# Patient Record
Sex: MALE | Race: WHITE | NOT HISPANIC OR LATINO | Employment: OTHER | URBAN - METROPOLITAN AREA
[De-identification: names, ages, dates, MRNs, and addresses within clinical notes are randomized per-mention and may not be internally consistent; named-entity substitution may affect disease eponyms.]

---

## 2024-04-04 ENCOUNTER — APPOINTMENT (OUTPATIENT)
Dept: RADIOLOGY | Facility: HOSPITAL | Age: 89
End: 2024-04-04
Payer: MEDICARE

## 2024-04-04 ENCOUNTER — HOSPITAL ENCOUNTER (OUTPATIENT)
Facility: HOSPITAL | Age: 89
Setting detail: OBSERVATION
Discharge: HOME | End: 2024-04-06
Attending: STUDENT IN AN ORGANIZED HEALTH CARE EDUCATION/TRAINING PROGRAM | Admitting: STUDENT IN AN ORGANIZED HEALTH CARE EDUCATION/TRAINING PROGRAM
Payer: MEDICARE

## 2024-04-04 DIAGNOSIS — G62.9 NEUROPATHY: ICD-10-CM

## 2024-04-04 DIAGNOSIS — R94.31 ACUTE ELECTROCARDIOGRAM CHANGES: ICD-10-CM

## 2024-04-04 DIAGNOSIS — R55 SYNCOPE, UNSPECIFIED SYNCOPE TYPE: Primary | ICD-10-CM
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
## 2024-04-04 DIAGNOSIS — R79.89 ELEVATED TROPONIN: ICD-10-CM

## 2024-04-04 LAB
ALBUMIN SERPL BCP-MCNC: 3.9 G/DL (ref 3.4–5)
ALP SERPL-CCNC: 56 U/L (ref 33–136)
ALT SERPL W P-5'-P-CCNC: 22 U/L (ref 10–52)
ANION GAP SERPL CALC-SCNC: 16 MMOL/L (ref 10–20)
AST SERPL W P-5'-P-CCNC: 22 U/L (ref 9–39)
BASOPHILS # BLD AUTO: 0.02 X10*3/UL (ref 0–0.1)
BASOPHILS NFR BLD AUTO: 0.2 %
BILIRUB SERPL-MCNC: 0.9 MG/DL (ref 0–1.2)
BUN SERPL-MCNC: 43 MG/DL (ref 6–23)
CALCIUM SERPL-MCNC: 8.9 MG/DL (ref 8.6–10.3)
CHLORIDE SERPL-SCNC: 101 MMOL/L (ref 98–107)
CO2 SERPL-SCNC: 24 MMOL/L (ref 21–32)
CREAT SERPL-MCNC: 1.91 MG/DL (ref 0.5–1.3)
EGFRCR SERPLBLD CKD-EPI 2021: 32 ML/MIN/1.73M*2
EOSINOPHIL # BLD AUTO: 0 X10*3/UL (ref 0–0.4)
EOSINOPHIL NFR BLD AUTO: 0 %
ERYTHROCYTE [DISTWIDTH] IN BLOOD BY AUTOMATED COUNT: 12.5 % (ref 11.5–14.5)
ETHANOL SERPL-MCNC: <10 MG/DL
GLUCOSE SERPL-MCNC: 225 MG/DL (ref 74–99)
HCT VFR BLD AUTO: 51.3 % (ref 41–52)
HGB BLD-MCNC: 17.4 G/DL (ref 13.5–17.5)
IMM GRANULOCYTES # BLD AUTO: 0.03 X10*3/UL (ref 0–0.5)
IMM GRANULOCYTES NFR BLD AUTO: 0.4 % (ref 0–0.9)
INR PPP: 1.3 (ref 0.9–1.1)
LACTATE SERPL-SCNC: 2.3 MMOL/L (ref 0.4–2)
LYMPHOCYTES # BLD AUTO: 0.95 X10*3/UL (ref 0.8–3)
LYMPHOCYTES NFR BLD AUTO: 11.1 %
MCH RBC QN AUTO: 34.3 PG (ref 26–34)
MCHC RBC AUTO-ENTMCNC: 33.9 G/DL (ref 32–36)
MCV RBC AUTO: 101 FL (ref 80–100)
MONOCYTES # BLD AUTO: 1.19 X10*3/UL (ref 0.05–0.8)
MONOCYTES NFR BLD AUTO: 13.9 %
NEUTROPHILS # BLD AUTO: 6.35 X10*3/UL (ref 1.6–5.5)
NEUTROPHILS NFR BLD AUTO: 74.4 %
NRBC BLD-RTO: 0 /100 WBCS (ref 0–0)
PLATELET # BLD AUTO: 146 X10*3/UL (ref 150–450)
POTASSIUM SERPL-SCNC: 4.7 MMOL/L (ref 3.5–5.3)
PROT SERPL-MCNC: 6.9 G/DL (ref 6.4–8.2)
PROTHROMBIN TIME: 14.4 SECONDS (ref 9.8–12.8)
RBC # BLD AUTO: 5.07 X10*6/UL (ref 4.5–5.9)
SODIUM SERPL-SCNC: 136 MMOL/L (ref 136–145)
WBC # BLD AUTO: 8.5 X10*3/UL (ref 4.4–11.3)

## 2024-04-04 PROCEDURE — 70450 CT HEAD/BRAIN W/O DYE: CPT

## 2024-04-04 PROCEDURE — 86901 BLOOD TYPING SEROLOGIC RH(D): CPT | Performed by: STUDENT IN AN ORGANIZED HEALTH CARE EDUCATION/TRAINING PROGRAM

## 2024-04-04 PROCEDURE — 85610 PROTHROMBIN TIME: CPT | Performed by: STUDENT IN AN ORGANIZED HEALTH CARE EDUCATION/TRAINING PROGRAM

## 2024-04-04 PROCEDURE — 71045 X-RAY EXAM CHEST 1 VIEW: CPT

## 2024-04-04 PROCEDURE — G0390 TRAUMA RESPONS W/HOSP CRITI: HCPCS

## 2024-04-04 PROCEDURE — 36415 COLL VENOUS BLD VENIPUNCTURE: CPT | Performed by: STUDENT IN AN ORGANIZED HEALTH CARE EDUCATION/TRAINING PROGRAM

## 2024-04-04 PROCEDURE — 82077 ASSAY SPEC XCP UR&BREATH IA: CPT | Performed by: STUDENT IN AN ORGANIZED HEALTH CARE EDUCATION/TRAINING PROGRAM

## 2024-04-04 PROCEDURE — 99291 CRITICAL CARE FIRST HOUR: CPT | Performed by: STUDENT IN AN ORGANIZED HEALTH CARE EDUCATION/TRAINING PROGRAM

## 2024-04-04 PROCEDURE — 83605 ASSAY OF LACTIC ACID: CPT | Performed by: STUDENT IN AN ORGANIZED HEALTH CARE EDUCATION/TRAINING PROGRAM

## 2024-04-04 PROCEDURE — 99285 EMERGENCY DEPT VISIT HI MDM: CPT | Mod: 25

## 2024-04-04 PROCEDURE — 84484 ASSAY OF TROPONIN QUANT: CPT | Performed by: STUDENT IN AN ORGANIZED HEALTH CARE EDUCATION/TRAINING PROGRAM

## 2024-04-04 PROCEDURE — 70450 CT HEAD/BRAIN W/O DYE: CPT | Performed by: RADIOLOGY

## 2024-04-04 PROCEDURE — 80053 COMPREHEN METABOLIC PANEL: CPT | Performed by: STUDENT IN AN ORGANIZED HEALTH CARE EDUCATION/TRAINING PROGRAM

## 2024-04-04 PROCEDURE — 85025 COMPLETE CBC W/AUTO DIFF WBC: CPT | Performed by: STUDENT IN AN ORGANIZED HEALTH CARE EDUCATION/TRAINING PROGRAM

## 2024-04-04 PROCEDURE — 83880 ASSAY OF NATRIURETIC PEPTIDE: CPT | Performed by: STUDENT IN AN ORGANIZED HEALTH CARE EDUCATION/TRAINING PROGRAM

## 2024-04-04 PROCEDURE — 83036 HEMOGLOBIN GLYCOSYLATED A1C: CPT | Performed by: STUDENT IN AN ORGANIZED HEALTH CARE EDUCATION/TRAINING PROGRAM

## 2024-04-05 ENCOUNTER — APPOINTMENT (OUTPATIENT)
Dept: CARDIOLOGY | Facility: HOSPITAL | Age: 89
End: 2024-04-05
Payer: MEDICARE

## 2024-04-05 ENCOUNTER — APPOINTMENT (OUTPATIENT)
Dept: RADIOLOGY | Facility: HOSPITAL | Age: 89
End: 2024-04-05
Payer: MEDICARE

## 2024-04-05 PROBLEM — R55 SYNCOPE, UNSPECIFIED SYNCOPE TYPE: Status: ACTIVE | Noted: 2024-04-05

## 2024-04-05 LAB
ABO GROUP (TYPE) IN BLOOD: NORMAL
ANION GAP SERPL CALC-SCNC: 14 MMOL/L (ref 10–20)
ANTIBODY SCREEN: NORMAL
AORTIC VALVE MEAN GRADIENT: 7 MMHG
AORTIC VALVE PEAK VELOCITY: 1.68 M/S
APPEARANCE UR: CLEAR
AV PEAK GRADIENT: 11.3 MMHG
AVA (PEAK VEL): 1.61 CM2
AVA (VTI): 2.11 CM2
BILIRUB UR STRIP.AUTO-MCNC: NEGATIVE MG/DL
BNP SERPL-MCNC: 81 PG/ML (ref 0–99)
BUN SERPL-MCNC: 41 MG/DL (ref 6–23)
CALCIUM SERPL-MCNC: 9.3 MG/DL (ref 8.6–10.3)
CARDIAC TROPONIN I PNL SERPL HS: 90 NG/L (ref 0–20)
CARDIAC TROPONIN I PNL SERPL HS: 91 NG/L (ref 0–20)
CHLORIDE SERPL-SCNC: 103 MMOL/L (ref 98–107)
CHOLEST SERPL-MCNC: 123 MG/DL (ref 0–199)
CHOLESTEROL/HDL RATIO: 3.7
CO2 SERPL-SCNC: 25 MMOL/L (ref 21–32)
COLOR UR: YELLOW
CREAT SERPL-MCNC: 1.75 MG/DL (ref 0.5–1.3)
EGFRCR SERPLBLD CKD-EPI 2021: 36 ML/MIN/1.73M*2
EJECTION FRACTION APICAL 4 CHAMBER: 72.2
ERYTHROCYTE [DISTWIDTH] IN BLOOD BY AUTOMATED COUNT: 12.6 % (ref 11.5–14.5)
EST. AVERAGE GLUCOSE BLD GHB EST-MCNC: 157 MG/DL
GLUCOSE BLD MANUAL STRIP-MCNC: 158 MG/DL (ref 74–99)
GLUCOSE BLD MANUAL STRIP-MCNC: 166 MG/DL (ref 74–99)
GLUCOSE BLD MANUAL STRIP-MCNC: 177 MG/DL (ref 74–99)
GLUCOSE BLD MANUAL STRIP-MCNC: 194 MG/DL (ref 74–99)
GLUCOSE BLD MANUAL STRIP-MCNC: 197 MG/DL (ref 74–99)
GLUCOSE SERPL-MCNC: 159 MG/DL (ref 74–99)
GLUCOSE UR STRIP.AUTO-MCNC: ABNORMAL MG/DL
HBA1C MFR BLD: 7.1 %
HCT VFR BLD AUTO: 50.5 % (ref 41–52)
HDLC SERPL-MCNC: 33.3 MG/DL
HGB BLD-MCNC: 17.1 G/DL (ref 13.5–17.5)
KETONES UR STRIP.AUTO-MCNC: NEGATIVE MG/DL
LACTATE SERPL-SCNC: 1.5 MMOL/L (ref 0.4–2)
LDLC SERPL CALC-MCNC: 61 MG/DL
LEFT ATRIUM VOLUME AREA LENGTH INDEX BSA: 26.9 ML/M2
LEFT VENTRICULAR OUTFLOW TRACT DIAMETER: 2 CM
LEUKOCYTE ESTERASE UR QL STRIP.AUTO: NEGATIVE
LV EJECTION FRACTION BIPLANE: 69 %
MAGNESIUM SERPL-MCNC: 2.53 MG/DL (ref 1.6–2.4)
MCH RBC QN AUTO: 34.3 PG (ref 26–34)
MCHC RBC AUTO-ENTMCNC: 33.9 G/DL (ref 32–36)
MCV RBC AUTO: 101 FL (ref 80–100)
MITRAL VALVE E/A RATIO: 0.74
NITRITE UR QL STRIP.AUTO: NEGATIVE
NON HDL CHOLESTEROL: 90 MG/DL (ref 0–149)
NRBC BLD-RTO: 0 /100 WBCS (ref 0–0)
PH UR STRIP.AUTO: 6 [PH]
PLATELET # BLD AUTO: 145 X10*3/UL (ref 150–450)
POTASSIUM SERPL-SCNC: 4.4 MMOL/L (ref 3.5–5.3)
PROT UR STRIP.AUTO-MCNC: NEGATIVE MG/DL
RBC # BLD AUTO: 4.99 X10*6/UL (ref 4.5–5.9)
RBC # UR STRIP.AUTO: NEGATIVE /UL
RH FACTOR (ANTIGEN D): NORMAL
RIGHT VENTRICLE FREE WALL PEAK S': 13.5 CM/S
RIGHT VENTRICLE PEAK SYSTOLIC PRESSURE: 18.2 MMHG
SODIUM SERPL-SCNC: 138 MMOL/L (ref 136–145)
SP GR UR STRIP.AUTO: 1.04
TRICUSPID ANNULAR PLANE SYSTOLIC EXCURSION: 2.7 CM
TRIGL SERPL-MCNC: 146 MG/DL (ref 0–149)
UROBILINOGEN UR STRIP.AUTO-MCNC: <2 MG/DL
VLDL: 29 MG/DL (ref 0–40)
WBC # BLD AUTO: 8 X10*3/UL (ref 4.4–11.3)

## 2024-04-05 PROCEDURE — 83605 ASSAY OF LACTIC ACID: CPT | Performed by: STUDENT IN AN ORGANIZED HEALTH CARE EDUCATION/TRAINING PROGRAM

## 2024-04-05 PROCEDURE — 96360 HYDRATION IV INFUSION INIT: CPT

## 2024-04-05 PROCEDURE — 93005 ELECTROCARDIOGRAM TRACING: CPT

## 2024-04-05 PROCEDURE — 2500000001 HC RX 250 WO HCPCS SELF ADMINISTERED DRUGS (ALT 637 FOR MEDICARE OP): Performed by: STUDENT IN AN ORGANIZED HEALTH CARE EDUCATION/TRAINING PROGRAM

## 2024-04-05 PROCEDURE — 2500000002 HC RX 250 W HCPCS SELF ADMINISTERED DRUGS (ALT 637 FOR MEDICARE OP, ALT 636 FOR OP/ED)

## 2024-04-05 PROCEDURE — 71275 CT ANGIOGRAPHY CHEST: CPT

## 2024-04-05 PROCEDURE — 81003 URINALYSIS AUTO W/O SCOPE: CPT | Performed by: STUDENT IN AN ORGANIZED HEALTH CARE EDUCATION/TRAINING PROGRAM

## 2024-04-05 PROCEDURE — 2550000001 HC RX 255 CONTRASTS: Performed by: STUDENT IN AN ORGANIZED HEALTH CARE EDUCATION/TRAINING PROGRAM

## 2024-04-05 PROCEDURE — 99204 OFFICE O/P NEW MOD 45 MIN: CPT | Performed by: STUDENT IN AN ORGANIZED HEALTH CARE EDUCATION/TRAINING PROGRAM

## 2024-04-05 PROCEDURE — 83735 ASSAY OF MAGNESIUM: CPT | Performed by: STUDENT IN AN ORGANIZED HEALTH CARE EDUCATION/TRAINING PROGRAM

## 2024-04-05 PROCEDURE — 36415 COLL VENOUS BLD VENIPUNCTURE: CPT | Performed by: STUDENT IN AN ORGANIZED HEALTH CARE EDUCATION/TRAINING PROGRAM

## 2024-04-05 PROCEDURE — 71275 CT ANGIOGRAPHY CHEST: CPT | Performed by: RADIOLOGY

## 2024-04-05 PROCEDURE — 97161 PT EVAL LOW COMPLEX 20 MIN: CPT | Mod: GP

## 2024-04-05 PROCEDURE — 80061 LIPID PANEL: CPT | Performed by: STUDENT IN AN ORGANIZED HEALTH CARE EDUCATION/TRAINING PROGRAM

## 2024-04-05 PROCEDURE — 2500000004 HC RX 250 GENERAL PHARMACY W/ HCPCS (ALT 636 FOR OP/ED): Performed by: STUDENT IN AN ORGANIZED HEALTH CARE EDUCATION/TRAINING PROGRAM

## 2024-04-05 PROCEDURE — 93306 TTE W/DOPPLER COMPLETE: CPT

## 2024-04-05 PROCEDURE — 2500000002 HC RX 250 W HCPCS SELF ADMINISTERED DRUGS (ALT 637 FOR MEDICARE OP, ALT 636 FOR OP/ED): Performed by: STUDENT IN AN ORGANIZED HEALTH CARE EDUCATION/TRAINING PROGRAM

## 2024-04-05 PROCEDURE — 99222 1ST HOSP IP/OBS MODERATE 55: CPT | Performed by: STUDENT IN AN ORGANIZED HEALTH CARE EDUCATION/TRAINING PROGRAM

## 2024-04-05 PROCEDURE — 76770 US EXAM ABDO BACK WALL COMP: CPT | Mod: FOREIGN READ | Performed by: RADIOLOGY

## 2024-04-05 PROCEDURE — G0378 HOSPITAL OBSERVATION PER HR: HCPCS

## 2024-04-05 PROCEDURE — 2500000001 HC RX 250 WO HCPCS SELF ADMINISTERED DRUGS (ALT 637 FOR MEDICARE OP)

## 2024-04-05 PROCEDURE — 93306 TTE W/DOPPLER COMPLETE: CPT | Performed by: STUDENT IN AN ORGANIZED HEALTH CARE EDUCATION/TRAINING PROGRAM

## 2024-04-05 PROCEDURE — 82947 ASSAY GLUCOSE BLOOD QUANT: CPT | Mod: 59

## 2024-04-05 PROCEDURE — 97165 OT EVAL LOW COMPLEX 30 MIN: CPT | Mod: GO

## 2024-04-05 PROCEDURE — 84484 ASSAY OF TROPONIN QUANT: CPT | Performed by: STUDENT IN AN ORGANIZED HEALTH CARE EDUCATION/TRAINING PROGRAM

## 2024-04-05 PROCEDURE — 76770 US EXAM ABDO BACK WALL COMP: CPT

## 2024-04-05 PROCEDURE — 85027 COMPLETE CBC AUTOMATED: CPT | Performed by: STUDENT IN AN ORGANIZED HEALTH CARE EDUCATION/TRAINING PROGRAM

## 2024-04-05 PROCEDURE — 80048 BASIC METABOLIC PNL TOTAL CA: CPT | Performed by: STUDENT IN AN ORGANIZED HEALTH CARE EDUCATION/TRAINING PROGRAM

## 2024-04-05 PROCEDURE — 71045 X-RAY EXAM CHEST 1 VIEW: CPT | Mod: FOREIGN READ | Performed by: RADIOLOGY

## 2024-04-05 RX ORDER — SODIUM CHLORIDE, SODIUM LACTATE, POTASSIUM CHLORIDE, CALCIUM CHLORIDE 600; 310; 30; 20 MG/100ML; MG/100ML; MG/100ML; MG/100ML
75 INJECTION, SOLUTION INTRAVENOUS CONTINUOUS
Status: ACTIVE | OUTPATIENT
Start: 2024-04-05 | End: 2024-04-05

## 2024-04-05 RX ORDER — POTASSIUM CHLORIDE 750 MG/1
20 TABLET, FILM COATED, EXTENDED RELEASE ORAL DAILY
COMMUNITY

## 2024-04-05 RX ORDER — METOPROLOL SUCCINATE 50 MG/1
50 TABLET, EXTENDED RELEASE ORAL DAILY
Status: DISCONTINUED | OUTPATIENT
Start: 2024-04-05 | End: 2024-04-06 | Stop reason: HOSPADM

## 2024-04-05 RX ORDER — ACETAMINOPHEN 325 MG/1
650 TABLET ORAL EVERY 4 HOURS PRN
Status: DISCONTINUED | OUTPATIENT
Start: 2024-04-05 | End: 2024-04-06 | Stop reason: HOSPADM

## 2024-04-05 RX ORDER — DEXTROSE 50 % IN WATER (D50W) INTRAVENOUS SYRINGE
12.5
Status: DISCONTINUED | OUTPATIENT
Start: 2024-04-05 | End: 2024-04-06 | Stop reason: HOSPADM

## 2024-04-05 RX ORDER — METOPROLOL SUCCINATE 50 MG/1
50 TABLET, EXTENDED RELEASE ORAL DAILY
COMMUNITY

## 2024-04-05 RX ORDER — INSULIN LISPRO 100 [IU]/ML
0-10 INJECTION, SOLUTION INTRAVENOUS; SUBCUTANEOUS EVERY 4 HOURS
Status: DISCONTINUED | OUTPATIENT
Start: 2024-04-05 | End: 2024-04-06 | Stop reason: HOSPADM

## 2024-04-05 RX ORDER — ATORVASTATIN CALCIUM 10 MG/1
10 TABLET, FILM COATED ORAL NIGHTLY
Status: DISCONTINUED | OUTPATIENT
Start: 2024-04-05 | End: 2024-04-06 | Stop reason: HOSPADM

## 2024-04-05 RX ORDER — TORSEMIDE 100 MG/1
50 TABLET ORAL DAILY
COMMUNITY

## 2024-04-05 RX ORDER — INSULIN GLARGINE 100 [IU]/ML
20 INJECTION, SOLUTION SUBCUTANEOUS NIGHTLY
Status: DISCONTINUED | OUTPATIENT
Start: 2024-04-05 | End: 2024-04-06 | Stop reason: HOSPADM

## 2024-04-05 RX ORDER — TAMSULOSIN HYDROCHLORIDE 0.4 MG/1
0.4 CAPSULE ORAL DAILY
COMMUNITY

## 2024-04-05 RX ORDER — ACETAMINOPHEN 650 MG/1
650 SUPPOSITORY RECTAL EVERY 4 HOURS PRN
Status: DISCONTINUED | OUTPATIENT
Start: 2024-04-05 | End: 2024-04-06 | Stop reason: HOSPADM

## 2024-04-05 RX ORDER — BUSPIRONE HYDROCHLORIDE 5 MG/1
5 TABLET ORAL DAILY
COMMUNITY

## 2024-04-05 RX ORDER — TAMSULOSIN HYDROCHLORIDE 0.4 MG/1
0.4 CAPSULE ORAL NIGHTLY
Status: DISCONTINUED | OUTPATIENT
Start: 2024-04-05 | End: 2024-04-06 | Stop reason: HOSPADM

## 2024-04-05 RX ORDER — DEXTROSE 50 % IN WATER (D50W) INTRAVENOUS SYRINGE
25
Status: DISCONTINUED | OUTPATIENT
Start: 2024-04-05 | End: 2024-04-06 | Stop reason: HOSPADM

## 2024-04-05 RX ORDER — INSULIN LISPRO 100 [IU]/ML
25 INJECTION, SOLUTION INTRAVENOUS; SUBCUTANEOUS
COMMUNITY

## 2024-04-05 RX ORDER — GABAPENTIN 300 MG/1
300 CAPSULE ORAL 2 TIMES DAILY
Status: DISCONTINUED | OUTPATIENT
Start: 2024-04-05 | End: 2024-04-06 | Stop reason: HOSPADM

## 2024-04-05 RX ORDER — NAPROXEN SODIUM 220 MG/1
81 TABLET, FILM COATED ORAL DAILY
Status: DISCONTINUED | OUTPATIENT
Start: 2024-04-06 | End: 2024-04-06 | Stop reason: HOSPADM

## 2024-04-05 RX ORDER — SPIRONOLACTONE 25 MG/1
25 TABLET ORAL DAILY
COMMUNITY

## 2024-04-05 RX ORDER — GABAPENTIN 300 MG/1
300 CAPSULE ORAL 3 TIMES DAILY
COMMUNITY
End: 2024-04-06 | Stop reason: HOSPADM

## 2024-04-05 RX ORDER — LOSARTAN POTASSIUM 25 MG/1
25 TABLET ORAL DAILY
COMMUNITY

## 2024-04-05 RX ORDER — NAPROXEN SODIUM 220 MG/1
324 TABLET, FILM COATED ORAL ONCE
Status: COMPLETED | OUTPATIENT
Start: 2024-04-05 | End: 2024-04-05

## 2024-04-05 RX ORDER — TALC
3 POWDER (GRAM) TOPICAL NIGHTLY PRN
Status: DISCONTINUED | OUTPATIENT
Start: 2024-04-05 | End: 2024-04-06 | Stop reason: HOSPADM

## 2024-04-05 RX ORDER — ATORVASTATIN CALCIUM 10 MG/1
10 TABLET, FILM COATED ORAL DAILY
COMMUNITY

## 2024-04-05 RX ORDER — INSULIN GLARGINE 100 [IU]/ML
30 INJECTION, SOLUTION SUBCUTANEOUS NIGHTLY
COMMUNITY

## 2024-04-05 RX ORDER — BUSPIRONE HYDROCHLORIDE 5 MG/1
5 TABLET ORAL 2 TIMES DAILY
Status: DISCONTINUED | OUTPATIENT
Start: 2024-04-05 | End: 2024-04-06 | Stop reason: HOSPADM

## 2024-04-05 RX ORDER — TOLTERODINE 2 MG/1
2 CAPSULE, EXTENDED RELEASE ORAL DAILY
COMMUNITY

## 2024-04-05 RX ORDER — ACETAMINOPHEN 160 MG/5ML
650 SOLUTION ORAL EVERY 4 HOURS PRN
Status: DISCONTINUED | OUTPATIENT
Start: 2024-04-05 | End: 2024-04-06 | Stop reason: HOSPADM

## 2024-04-05 RX ADMIN — GABAPENTIN 300 MG: 300 CAPSULE ORAL at 20:41

## 2024-04-05 RX ADMIN — BUSPIRONE HYDROCHLORIDE 5 MG: 5 TABLET ORAL at 20:41

## 2024-04-05 RX ADMIN — SODIUM CHLORIDE, POTASSIUM CHLORIDE, SODIUM LACTATE AND CALCIUM CHLORIDE 500 ML: 600; 310; 30; 20 INJECTION, SOLUTION INTRAVENOUS at 01:53

## 2024-04-05 RX ADMIN — BUSPIRONE HYDROCHLORIDE 5 MG: 5 TABLET ORAL at 09:20

## 2024-04-05 RX ADMIN — ATORVASTATIN CALCIUM 10 MG: 10 TABLET, FILM COATED ORAL at 20:41

## 2024-04-05 RX ADMIN — INSULIN LISPRO 2 UNITS: 100 INJECTION, SOLUTION INTRAVENOUS; SUBCUTANEOUS at 12:26

## 2024-04-05 RX ADMIN — SODIUM CHLORIDE, POTASSIUM CHLORIDE, SODIUM LACTATE AND CALCIUM CHLORIDE 75 ML/HR: 600; 310; 30; 20 INJECTION, SOLUTION INTRAVENOUS at 06:06

## 2024-04-05 RX ADMIN — METOPROLOL SUCCINATE 50 MG: 50 TABLET, EXTENDED RELEASE ORAL at 09:20

## 2024-04-05 RX ADMIN — INSULIN LISPRO 2 UNITS: 100 INJECTION, SOLUTION INTRAVENOUS; SUBCUTANEOUS at 19:57

## 2024-04-05 RX ADMIN — INSULIN LISPRO 2 UNITS: 100 INJECTION, SOLUTION INTRAVENOUS; SUBCUTANEOUS at 09:02

## 2024-04-05 RX ADMIN — TAMSULOSIN HYDROCHLORIDE 0.4 MG: 0.4 CAPSULE ORAL at 20:41

## 2024-04-05 RX ADMIN — IOHEXOL 90 ML: 350 INJECTION, SOLUTION INTRAVENOUS at 01:55

## 2024-04-05 RX ADMIN — GABAPENTIN 300 MG: 300 CAPSULE ORAL at 09:20

## 2024-04-05 RX ADMIN — ASPIRIN 81 MG CHEWABLE TABLET 324 MG: 81 TABLET CHEWABLE at 00:20

## 2024-04-05 RX ADMIN — INSULIN GLARGINE 20 UNITS: 100 INJECTION, SOLUTION SUBCUTANEOUS at 20:41

## 2024-04-05 SDOH — SOCIAL STABILITY: SOCIAL INSECURITY: ABUSE: ADULT

## 2024-04-05 SDOH — SOCIAL STABILITY: SOCIAL NETWORK
DO YOU BELONG TO ANY CLUBS OR ORGANIZATIONS SUCH AS CHURCH GROUPS UNIONS, FRATERNAL OR ATHLETIC GROUPS, OR SCHOOL GROUPS?: NO

## 2024-04-05 SDOH — ECONOMIC STABILITY: INCOME INSECURITY: IN THE PAST 12 MONTHS, HAS THE ELECTRIC, GAS, OIL, OR WATER COMPANY THREATENED TO SHUT OFF SERVICE IN YOUR HOME?: NO

## 2024-04-05 SDOH — SOCIAL STABILITY: SOCIAL INSECURITY: DOES ANYONE TRY TO KEEP YOU FROM HAVING/CONTACTING OTHER FRIENDS OR DOING THINGS OUTSIDE YOUR HOME?: NO

## 2024-04-05 SDOH — SOCIAL STABILITY: SOCIAL INSECURITY
WITHIN THE LAST YEAR, HAVE YOU BEEN KICKED, HIT, SLAPPED, OR OTHERWISE PHYSICALLY HURT BY YOUR PARTNER OR EX-PARTNER?: NO

## 2024-04-05 SDOH — HEALTH STABILITY: MENTAL HEALTH
STRESS IS WHEN SOMEONE FEELS TENSE, NERVOUS, ANXIOUS, OR CAN'T SLEEP AT NIGHT BECAUSE THEIR MIND IS TROUBLED. HOW STRESSED ARE YOU?: NOT AT ALL

## 2024-04-05 SDOH — SOCIAL STABILITY: SOCIAL INSECURITY: WERE YOU ABLE TO COMPLETE ALL THE BEHAVIORAL HEALTH SCREENINGS?: YES

## 2024-04-05 SDOH — SOCIAL STABILITY: SOCIAL INSECURITY: DO YOU FEEL ANYONE HAS EXPLOITED OR TAKEN ADVANTAGE OF YOU FINANCIALLY OR OF YOUR PERSONAL PROPERTY?: NO

## 2024-04-05 SDOH — SOCIAL STABILITY: SOCIAL INSECURITY
WITHIN THE LAST YEAR, HAVE TO BEEN RAPED OR FORCED TO HAVE ANY KIND OF SEXUAL ACTIVITY BY YOUR PARTNER OR EX-PARTNER?: NO

## 2024-04-05 SDOH — SOCIAL STABILITY: SOCIAL INSECURITY: DO YOU FEEL UNSAFE GOING BACK TO THE PLACE WHERE YOU ARE LIVING?: NO

## 2024-04-05 SDOH — SOCIAL STABILITY: SOCIAL INSECURITY: ARE YOU OR HAVE YOU BEEN THREATENED OR ABUSED PHYSICALLY, EMOTIONALLY, OR SEXUALLY BY ANYONE?: NO

## 2024-04-05 SDOH — ECONOMIC STABILITY: FOOD INSECURITY: WITHIN THE PAST 12 MONTHS, YOU WORRIED THAT YOUR FOOD WOULD RUN OUT BEFORE YOU GOT MONEY TO BUY MORE.: NEVER TRUE

## 2024-04-05 SDOH — SOCIAL STABILITY: SOCIAL INSECURITY: WITHIN THE LAST YEAR, HAVE YOU BEEN AFRAID OF YOUR PARTNER OR EX-PARTNER?: NO

## 2024-04-05 SDOH — SOCIAL STABILITY: SOCIAL NETWORK: ARE YOU MARRIED, WIDOWED, DIVORCED, SEPARATED, NEVER MARRIED, OR LIVING WITH A PARTNER?: WIDOWED

## 2024-04-05 SDOH — SOCIAL STABILITY: SOCIAL INSECURITY: ARE THERE ANY APPARENT SIGNS OF INJURIES/BEHAVIORS THAT COULD BE RELATED TO ABUSE/NEGLECT?: NO

## 2024-04-05 SDOH — ECONOMIC STABILITY: HOUSING INSECURITY
IN THE LAST 12 MONTHS, WAS THERE A TIME WHEN YOU DID NOT HAVE A STEADY PLACE TO SLEEP OR SLEPT IN A SHELTER (INCLUDING NOW)?: NO

## 2024-04-05 SDOH — ECONOMIC STABILITY: INCOME INSECURITY: HOW HARD IS IT FOR YOU TO PAY FOR THE VERY BASICS LIKE FOOD, HOUSING, MEDICAL CARE, AND HEATING?: NOT VERY HARD

## 2024-04-05 SDOH — HEALTH STABILITY: MENTAL HEALTH: HOW OFTEN DO YOU HAVE 6 OR MORE DRINKS ON ONE OCCASION?: NEVER

## 2024-04-05 SDOH — SOCIAL STABILITY: SOCIAL INSECURITY: WITHIN THE LAST YEAR, HAVE YOU BEEN HUMILIATED OR EMOTIONALLY ABUSED IN OTHER WAYS BY YOUR PARTNER OR EX-PARTNER?: NO

## 2024-04-05 SDOH — SOCIAL STABILITY: SOCIAL NETWORK: IN A TYPICAL WEEK, HOW MANY TIMES DO YOU TALK ON THE PHONE WITH FAMILY, FRIENDS, OR NEIGHBORS?: NEVER

## 2024-04-05 SDOH — ECONOMIC STABILITY: FOOD INSECURITY: WITHIN THE PAST 12 MONTHS, THE FOOD YOU BOUGHT JUST DIDN'T LAST AND YOU DIDN'T HAVE MONEY TO GET MORE.: NEVER TRUE

## 2024-04-05 SDOH — ECONOMIC STABILITY: INCOME INSECURITY: IN THE LAST 12 MONTHS, WAS THERE A TIME WHEN YOU WERE NOT ABLE TO PAY THE MORTGAGE OR RENT ON TIME?: NO

## 2024-04-05 SDOH — HEALTH STABILITY: MENTAL HEALTH: HOW OFTEN DO YOU HAVE A DRINK CONTAINING ALCOHOL?: NEVER

## 2024-04-05 SDOH — ECONOMIC STABILITY: TRANSPORTATION INSECURITY
IN THE PAST 12 MONTHS, HAS THE LACK OF TRANSPORTATION KEPT YOU FROM MEDICAL APPOINTMENTS OR FROM GETTING MEDICATIONS?: NO

## 2024-04-05 SDOH — HEALTH STABILITY: MENTAL HEALTH: HOW MANY STANDARD DRINKS CONTAINING ALCOHOL DO YOU HAVE ON A TYPICAL DAY?: PATIENT DOES NOT DRINK

## 2024-04-05 SDOH — SOCIAL STABILITY: SOCIAL INSECURITY: HAS ANYONE EVER THREATENED TO HURT YOUR FAMILY OR YOUR PETS?: NO

## 2024-04-05 SDOH — SOCIAL STABILITY: SOCIAL INSECURITY: HAVE YOU HAD THOUGHTS OF HARMING ANYONE ELSE?: NO

## 2024-04-05 SDOH — HEALTH STABILITY: PHYSICAL HEALTH: ON AVERAGE, HOW MANY DAYS PER WEEK DO YOU ENGAGE IN MODERATE TO STRENUOUS EXERCISE (LIKE A BRISK WALK)?: 0 DAYS

## 2024-04-05 SDOH — ECONOMIC STABILITY: HOUSING INSECURITY: IN THE LAST 12 MONTHS, HOW MANY PLACES HAVE YOU LIVED?: 1

## 2024-04-05 SDOH — SOCIAL STABILITY: SOCIAL NETWORK: HOW OFTEN DO YOU GET TOGETHER WITH FRIENDS OR RELATIVES?: NEVER

## 2024-04-05 SDOH — SOCIAL STABILITY: SOCIAL NETWORK: HOW OFTEN DO YOU ATTENT MEETINGS OF THE CLUB OR ORGANIZATION YOU BELONG TO?: NEVER

## 2024-04-05 SDOH — ECONOMIC STABILITY: TRANSPORTATION INSECURITY
IN THE PAST 12 MONTHS, HAS LACK OF TRANSPORTATION KEPT YOU FROM MEETINGS, WORK, OR FROM GETTING THINGS NEEDED FOR DAILY LIVING?: NO

## 2024-04-05 SDOH — SOCIAL STABILITY: SOCIAL NETWORK: HOW OFTEN DO YOU ATTEND CHURCH OR RELIGIOUS SERVICES?: NEVER

## 2024-04-05 SDOH — HEALTH STABILITY: PHYSICAL HEALTH: ON AVERAGE, HOW MANY MINUTES DO YOU ENGAGE IN EXERCISE AT THIS LEVEL?: 0 MIN

## 2024-04-05 ASSESSMENT — COGNITIVE AND FUNCTIONAL STATUS - GENERAL
STANDING UP FROM CHAIR USING ARMS: A LITTLE
DAILY ACTIVITIY SCORE: 18
MOBILITY SCORE: 19
DRESSING REGULAR LOWER BODY CLOTHING: A LOT
HELP NEEDED FOR BATHING: A LOT
DRESSING REGULAR LOWER BODY CLOTHING: A LOT
TOILETING: A LITTLE
WALKING IN HOSPITAL ROOM: A LITTLE
MOBILITY SCORE: 19
DRESSING REGULAR UPPER BODY CLOTHING: A LITTLE
DAILY ACTIVITIY SCORE: 19
HELP NEEDED FOR BATHING: A LITTLE
CLIMB 3 TO 5 STEPS WITH RAILING: A LITTLE
DRESSING REGULAR LOWER BODY CLOTHING: A LITTLE
TOILETING: A LITTLE
MOVING TO AND FROM BED TO CHAIR: A LITTLE
WALKING IN HOSPITAL ROOM: A LITTLE
WALKING IN HOSPITAL ROOM: A LITTLE
TURNING FROM BACK TO SIDE WHILE IN FLAT BAD: A LITTLE
DRESSING REGULAR UPPER BODY CLOTHING: A LITTLE
DAILY ACTIVITIY SCORE: 18
TURNING FROM BACK TO SIDE WHILE IN FLAT BAD: A LITTLE
PERSONAL GROOMING: A LITTLE
DRESSING REGULAR UPPER BODY CLOTHING: A LITTLE
PATIENT BASELINE BEDBOUND: NO
MOVING TO AND FROM BED TO CHAIR: A LITTLE
TOILETING: A LITTLE
MOBILITY SCORE: 20
CLIMB 3 TO 5 STEPS WITH RAILING: A LITTLE
STANDING UP FROM CHAIR USING ARMS: A LITTLE
HELP NEEDED FOR BATHING: A LOT
STANDING UP FROM CHAIR USING ARMS: A LITTLE
CLIMB 3 TO 5 STEPS WITH RAILING: A LITTLE
MOVING TO AND FROM BED TO CHAIR: A LITTLE

## 2024-04-05 ASSESSMENT — LIFESTYLE VARIABLES
HOW MANY STANDARD DRINKS CONTAINING ALCOHOL DO YOU HAVE ON A TYPICAL DAY: PATIENT DOES NOT DRINK
AUDIT-C TOTAL SCORE: 0
HOW OFTEN DO YOU HAVE A DRINK CONTAINING ALCOHOL: NEVER
HOW OFTEN DO YOU HAVE 6 OR MORE DRINKS ON ONE OCCASION: NEVER
SKIP TO QUESTIONS 9-10: 1
SKIP TO QUESTIONS 9-10: 1
AUDIT-C TOTAL SCORE: 0
AUDIT-C TOTAL SCORE: 0
SUBSTANCE_ABUSE_PAST_12_MONTHS: NO
PRESCIPTION_ABUSE_PAST_12_MONTHS: NO

## 2024-04-05 ASSESSMENT — ENCOUNTER SYMPTOMS
PALPITATIONS: 0
PND: 0
ALLERGIC/IMMUNOLOGIC NEGATIVE: 1
ORTHOPNEA: 0
CONSTITUTIONAL NEGATIVE: 1
SYNCOPE: 0
HEMATOLOGIC/LYMPHATIC NEGATIVE: 1
NEAR-SYNCOPE: 0
GASTROINTESTINAL NEGATIVE: 1
NEUROLOGICAL NEGATIVE: 1
EYES NEGATIVE: 1
FALLS: 1
ENDOCRINE NEGATIVE: 1
RESPIRATORY NEGATIVE: 1
PSYCHIATRIC NEGATIVE: 1

## 2024-04-05 ASSESSMENT — PAIN - FUNCTIONAL ASSESSMENT
PAIN_FUNCTIONAL_ASSESSMENT: 0-10
PAIN_FUNCTIONAL_ASSESSMENT: 0-10

## 2024-04-05 ASSESSMENT — ACTIVITIES OF DAILY LIVING (ADL)
PATIENT'S MEMORY ADEQUATE TO SAFELY COMPLETE DAILY ACTIVITIES?: YES
GROOMING: NEEDS ASSISTANCE
BATHING: NEEDS ASSISTANCE
TOILETING: NEEDS ASSISTANCE
LACK_OF_TRANSPORTATION: NO
ADEQUATE_TO_COMPLETE_ADL: YES
FEEDING YOURSELF: INDEPENDENT
JUDGMENT_ADEQUATE_SAFELY_COMPLETE_DAILY_ACTIVITIES: YES
HEARING - RIGHT EAR: HEARING AID
DRESSING YOURSELF: NEEDS ASSISTANCE
HEARING - LEFT EAR: HEARING AID
WALKS IN HOME: NEEDS ASSISTANCE
ASSISTIVE_DEVICE: WALKER

## 2024-04-05 ASSESSMENT — PATIENT HEALTH QUESTIONNAIRE - PHQ9
SUM OF ALL RESPONSES TO PHQ9 QUESTIONS 1 & 2: 0
2. FEELING DOWN, DEPRESSED OR HOPELESS: NOT AT ALL
1. LITTLE INTEREST OR PLEASURE IN DOING THINGS: NOT AT ALL

## 2024-04-05 ASSESSMENT — PAIN SCALES - GENERAL
PAINLEVEL_OUTOF10: 0 - NO PAIN

## 2024-04-05 ASSESSMENT — COLUMBIA-SUICIDE SEVERITY RATING SCALE - C-SSRS
2. HAVE YOU ACTUALLY HAD ANY THOUGHTS OF KILLING YOURSELF?: NO
6. HAVE YOU EVER DONE ANYTHING, STARTED TO DO ANYTHING, OR PREPARED TO DO ANYTHING TO END YOUR LIFE?: NO
1. IN THE PAST MONTH, HAVE YOU WISHED YOU WERE DEAD OR WISHED YOU COULD GO TO SLEEP AND NOT WAKE UP?: YES

## 2024-04-05 NOTE — ED PROVIDER NOTES
HPI   Chief Complaint   Patient presents with    Fall    Syncope       HPI     Patient is a 89-year-old male with past medical history CKD, A-fib, diabetes, CHF and hypertension with a pacemaker which was placed in 2021 presenting after a fall.  Patient himself here is poor recollection regarding the fall states he was try to get up and then got dizzy.  Per EMS he initially reported mechanical nature but then he tried to get to his walker and kept bumping into the wall.  He comes from an assisted living facility.  Reportedly his son is on the way.  He is alert and oriented x 3 but slow to respond and slightly confused.  Denies any pain at this time specifically denies any headache, neck pain, chest pain, abdominal pain, shortness of breath or acute complaints elsewise.               Mary Coma Scale Score: 15                     Patient History   History reviewed. No pertinent past medical history.  History reviewed. No pertinent surgical history.  No family history on file.  Social History     Tobacco Use    Smoking status: Never     Passive exposure: Never    Smokeless tobacco: Never   Substance Use Topics    Alcohol use: Never    Drug use: Never       Physical Exam   ED Triage Vitals [04/04/24 2308]   Temperature Heart Rate Respirations BP   37 °C (98.6 °F) 88 16 109/57      Pulse Ox Temp src Heart Rate Source Patient Position   (!) 93 % -- -- --      BP Location FiO2 (%)     Left arm --       Physical Exam  Vitals and nursing note reviewed.   Constitutional:       General: He is not in acute distress.     Appearance: He is well-developed.   HENT:      Head: Normocephalic and atraumatic.   Eyes:      Conjunctiva/sclera: Conjunctivae normal.   Cardiovascular:      Rate and Rhythm: Normal rate and regular rhythm.      Heart sounds: No murmur heard.  Pulmonary:      Effort: Pulmonary effort is normal. No respiratory distress.      Breath sounds: Normal breath sounds.   Abdominal:      Palpations: Abdomen is soft.       Tenderness: There is no abdominal tenderness.   Musculoskeletal:         General: No swelling.      Cervical back: Neck supple.   Skin:     General: Skin is warm and dry.      Capillary Refill: Capillary refill takes less than 2 seconds.   Neurological:      Mental Status: He is alert.   Psychiatric:         Mood and Affect: Mood normal.         ED Course & [facility]   ED Course as of 04/07/24 1107   Fri Apr 05, 2024 0035 EKG as interpreted by myself: rate 83, , , Qtc 444. Inferior t wave inversion and depressions. No STEMI.  [AH]   0218 Interpreted by the Emergency Department Attending: ECG revealed normal sinus rhythm at a rate of 72 beats per minute with PA interval 336 , QRS of 108 , QTc of 453.  No acute injury pattern.  No significant change. [MG]      ED Course User Index  [AH] Annette Holland MD  [MG] Zak Kirk DO         Diagnoses as of 04/07/24 1107   Syncope, unspecified syncope type   Elevated troponin   Acute electrocardiogram changes   Fall, initial encounter       Medical Decision Making  Is a 89-year-old male presenting to the emergency department as above on arrival here hemodynamically he is stable on bedside assessment he is comfortable he is an abrasion to the forehead.  He is moving all extremities he is alert and oriented x 3.  Trauma protocol followed and patient will be taken for CT head imaging.  Given patient's poor recollection regarding the actual event regarding his fall a syncope workup is pursued.  His son reportedly is on his way will clarify with patient's son at bedside.    Reviewed as of yet notable for troponin elevation, aspirin ordered.  Suspect likely need for admission for cardiology evaluation and pacemaker interrogation.  Patient is unsure what pacemaker he has.  CT angio chest pending signout to oncoming provider with plans for admission.    Procedure  Critical Care    Performed by: Annette Holland MD  Authorized by: Annette Holland MD    Critical care  provider statement:     Critical care time (minutes):  31    Critical care time was exclusive of:  Separately billable procedures and treating other patients and teaching time    Critical care was necessary to treat or prevent imminent or life-threatening deterioration of the following conditions:  Trauma    Critical care was time spent personally by me on the following activities:  Development of treatment plan with patient or surrogate, evaluation of patient's response to treatment, examination of patient, obtaining history from patient or surrogate, ordering and performing treatments and interventions, ordering and review of laboratory studies, ordering and review of radiographic studies and re-evaluation of patient's condition    Care discussed with: admitting provider         Anntete Holland MD  04/07/24 1923

## 2024-04-05 NOTE — PROGRESS NOTES
04/05/24 1201   Discharge Planning   Care Facility Name [facility] senior apartment   Home or Post Acute Services In home services   Patient expects to be discharged to: [facility]   Does the patient need discharge transport arranged? Yes   RoundTrip coordination needed? Yes     Met with the patient in the room, he states that he lives at [facility]. He states that they provide one meal per day, he uses a walker for ambulation. He states his son will drive him back when discharged. Noted that patient was trying to get out of bed without assistance to go to the bathroom. Staff arrived to assist patient. Called his son via telephone, he states that the patient has lived at [facility] for about 3 years. The son states that [facility] is a senior apartment, the family comes over daily to give patient his medications and assist with breakfast, any care needs. He has had HHC via [facility] in the past, also has been in a rehab facility in the past. Needs TBD after seen by therapies. Current plan is to return to [facility].   4:07 pm Notified patient son Will of progress in PT/OT, patient can qualify for HHC. Emailed a list of HHC agencies to [email or web address]. He states he will look over and discuss with family members. Patient PCP is with [facility], If  HHC chosen, will need  MD to follow.  4:23 Son indicated he would consider  HHC, but will first discuss with family. Per attending MD, he would consider following if  HHC chosen by the family.

## 2024-04-05 NOTE — PROGRESS NOTES
In short this is a 89-year-old male presenting to the emergency department for fall with syncope.  He was signed out to me by the previous provider to follow-up on CTA of the chest.  Initially it was reported as more of a mechanical fall although he is unable to clarify.  Plan at time of signout was admission after CTA for syncope workup.  He is already been given aspirin for elevated cardiac troponins.  He has had no ST changes on EKG although nonspecific T wave inversions.  Has had no chest pain during the emergency department visit.  I did evaluate the patient at bedside he is resting comfortably mentating appropriately.  Asymptomatic.    VS: As documented in the triage note from today's date and EMR flowsheet were reviewed.  Gen: Well developed. No acute distress. Seated in bed. Appears nontoxic.  GCS 15  Skin: Warm. Dry. Intact. No rashes or lesions.  Small amount of ecchymosis to the right wrist no anatomical snuffbox tenderness.  Eyes: Pupils equally round and reactive to light. Clear sclera. EOMI.  HENT: Ecchymosis left forehead mucosal membranes moist. No oral lesions, uvula midline, airway patent.   CV: Regular rate and regular rhythm. S1, S2. No pedal edema. Warm extremities.  Resp: Nonlabored breathing Clear to auscultation bilaterally. No increased work of breathing.   GI: Soft and nontender. No rebound or guarding. Bowel sounds x4 present.   MSK: Symmetric muscle bulk. No joint swelling in the extremities. Compartments are soft. Neurovascularly intact x4 extremities. Radial pulses +2 equal bilaterally.  Pedal pulses +2 equal bilaterally.  Remainder of extremities are atraumatic pelvis is stable.  No CTL spine tenderness.  No step-offs.  Neuro: Alert. CN II - XII intact. Speech fluent. Moving all extremities. No focal deficits.   Psych: Appropriate. Kempt.    Patient remains hemodynamically stable vital signs within normal range.  His cardiac troponin has no significant delta change no acute injury  pattern on repeat EKG.  Remains chest pain-free.  Remainder of lab work is highly benign.  Possible MARCO was encouraged p.o. intake no previous baseline to compare is hyperglycemic although no evidence of DKA normal anion gap.  CT of the chest with incidental findings no evidence of PE or dissection.  All incidental findings were discussed with the patient recommended follow-up.  Patient was admitted to the medicine service for syncope workup he has had no runs of dysrhythmias while in the department.  They did take over care at time of admission order.  Patient is appreciative of care agreeable with this plan.    ED Course as of 04/05/24 1640   Fri Apr 05, 2024   0035 EKG as interpreted by myself: rate 83, , , Qtc 444. Inferior t wave inversion and depressions. No STEMI.  [AH]   0218 Interpreted by the Emergency Department Attending: ECG revealed normal sinus rhythm at a rate of 72 beats per minute with [place] interval 336 , QRS of 108 , QTc of 453.  No acute injury pattern.  No significant change. [MG]      ED Course User Index  [AH] Annette Holland MD  [MG] Zak Kirk DO         Diagnoses as of 04/05/24 1640   Syncope, unspecified syncope type   Elevated troponin   Acute electrocardiogram changes   Fall, initial encounter     Transthoracic Echo (TTE) Complete   Final Result      US renal complete   Final Result   No hydronephrosis bilaterally.    Signed by Keagan Hernandez MD      CT angio chest for pulmonary embolism   Final Result   No pulmonary embolism.        Elevated left hemidiaphragm and mild left basilar atelectasis.             MACRO:   None        Signed by: Rachel Jenkins 4/5/2024 2:46 AM   Dictation workstation:   [id number]      XR chest 1 view   Final Result   Mild cardiac enlargement.   Signed by Zak Umanzor MD      CT head W O contrast trauma protocol   Final Result   No acute intracranial hemorrhage or calvarial fracture.                  MACRO:   None        Signed by: Rachel  Gregory 4/5/2024 12:10 AM   Dictation workstation:   [id number]      Cardiac device check - Inpatient    (Results Pending)       Zak Kirk DO

## 2024-04-05 NOTE — CONSULTS
Cardiology Consultation- New Consult    Inpatient consult to Cardiology  Consult performed by: Sunday Rojas MD  Consult ordered by: Lane Caruso MD      Primary Cardiologist (Dr. Umaña @ [facility])    HPI: Jacky Winkler is a 89 y.o.  male who presented after fall. Past medical history of chronic atrial fibrillation (on apixaban), SSS s/p PPM (2021; Medtronic single RV lead PPM for sick sinus syndrome), HTN, DLD, hx chronic diastolic HF, ? ASHD, Type II DM, and stage III CKD.     Patient had a mechanical fall at home while walking with his walker.  Reportedly patient lost his balance resulting in fall.  He denied any syncope or loss of consciousness.  Denies any chest pain.  Noted minimal abrasions on his right elbow and forehead.  Patient denies any orthopnea or PND.  Patient follows with his electrophysiologist Dr. Trent at [facility].    In the ED, EKG showed no acute ischemic changes; nonspecific T wave inversions.  Serial high-sensitivity troponins were flat 90, 91.  CT head was negative for acute intracranial process.  CT chest with elevated left hemidiaphragm, bibasilar atelectasis, no PE.  Cardiology consulted given chronic atrial fibrillation, chronic diastolic heart failure.    Past Medical History:   HTN  Chronic diastolic heart failure  T2DM  HLD  BPH  Paroxysmal Afib  CKD III  Anxiety  SSS s/p PPM (2021)    Surgical History:   Pacemaker  Tonsillectomy  Cholecystectomy        Family History:   - prostate cancer (father), lung cancer (mom); denies family history of heart problems     Allergies:  Patient has no known allergies.     Social History:   - Non-smoker; no illicit drug use; occasional alcohol use    Prior Cardiovascular Testing (Personally Reviewed):     TTE (4/5/2024)   1. Left ventricular systolic function is normal with a 70-75% estimated ejection fraction.   2. Spectral Doppler shows an impaired relaxation pattern of left ventricular diastolic filling.   3.  The left atrium is enlarged.   4. Mild aortic valve regurgitation.     ColibrÃ­tronic pacemaker interrogation (1/20/2024)  - Single-chamber pacemaker remote evaluation  - No ventricular detections  - Sensing is appropriate; review of lead impedances and trends are normal; auto threshold is stable; RV pacing 6D 3.9%; RENAE is 12.7 years    Review of Systems:  Review of Systems   Constitutional: Negative.   HENT: Negative.     Eyes: Negative.    Cardiovascular:  Negative for chest pain, near-syncope, orthopnea, palpitations, paroxysmal nocturnal dyspnea and syncope.   Respiratory: Negative.     Endocrine: Negative.    Hematologic/Lymphatic: Negative.    Skin: Negative.    Musculoskeletal:  Positive for arthritis and falls.   Gastrointestinal: Negative.    Genitourinary: Negative.    Neurological: Negative.    Psychiatric/Behavioral: Negative.     Allergic/Immunologic: Negative.        Objective     Outpatient Medications:    Current Facility-Administered Medications:     acetaminophen (Tylenol) tablet 650 mg, 650 mg, oral, q4h PRN **OR** acetaminophen (Tylenol) oral liquid 650 mg, 650 mg, oral, q4h PRN **OR** acetaminophen (Tylenol) suppository 650 mg, 650 mg, rectal, q4h PRN, Rj Key DO    [START ON 4/6/2024] aspirin chewable tablet 81 mg, 81 mg, oral, Daily, Rj Key,     atorvastatin (Lipitor) tablet 10 mg, 10 mg, oral, Nightly, Rj Key, DO    busPIRone (Buspar) tablet 5 mg, 5 mg, oral, BID, Rj Key, , 5 mg at 04/05/24 0920    dextrose 50 % injection 12.5 g, 12.5 g, intravenous, q15 min PRN, Rj Key, DO    dextrose 50 % injection 25 g, 25 g, intravenous, q15 min PRN, Rj Key, DO    gabapentin (Neurontin) capsule 300 mg, 300 mg, oral, BID, Rj Key, DO, 300 mg at 04/05/24 0920    glucagon (Glucagen) injection 1 mg, 1 mg, intramuscular, q15 min PRN, Rj Key,     insulin glargine (Lantus) injection 20 Units, 20 Units, subcutaneous, Nightly, Rj Key,     insulin lispro (HumaLOG) injection  "0-10 Units, 0-10 Units, subcutaneous, q4h, Rj Key DO, 2 Units at 04/05/24 1226    melatonin tablet 3 mg, 3 mg, oral, Nightly PRN, Rj Key DO    metoprolol succinate XL (Toprol-XL) 24 hr tablet 50 mg, 50 mg, oral, Daily, Rj Key DO, 50 mg at 04/05/24 0920    tamsulosin (Flomax) 24 hr capsule 0.4 mg, 0.4 mg, oral, Nightly, Rj Key, DO     Inpatient Medications:  Scheduled medications   Medication Dose Route Frequency    [START ON 4/6/2024] aspirin  81 mg oral Daily    atorvastatin  10 mg oral Nightly    busPIRone  5 mg oral BID    gabapentin  300 mg oral BID    insulin glargine  20 Units subcutaneous Nightly    insulin lispro  0-10 Units subcutaneous q4h    metoprolol succinate XL  50 mg oral Daily    tamsulosin  0.4 mg oral Nightly       PRN medications   Medication    acetaminophen    Or    acetaminophen    Or    acetaminophen    dextrose    dextrose    glucagon    melatonin       Continuous Medications   Medication Dose Last Rate       Last Recorded Vitals  /81   Pulse 76   Temp 36 °C (96.8 °F) (Temporal)   Resp 20   Ht 1.676 m (5' 6\")   Wt 62 kg (136 lb 11 oz)   SpO2 93%   BMI 22.06 kg/m²     Physical Exam:    Physical Exam  Constitutional:       General: He is not in acute distress.  HENT:      Head: Normocephalic.      Mouth/Throat:      Mouth: Mucous membranes are moist.   Eyes:      Extraocular Movements: Extraocular movements intact.      Conjunctiva/sclera: Conjunctivae normal.   Neck:      Vascular: No JVD.   Cardiovascular:      Rate and Rhythm: Normal rate. Rhythm irregular.      Heart sounds: No murmur heard.  Pulmonary:      Effort: Pulmonary effort is normal. No respiratory distress.      Breath sounds: Normal breath sounds.   Abdominal:      General: Bowel sounds are normal. There is no distension.      Palpations: Abdomen is soft.   Musculoskeletal:         General: No swelling.      Right lower leg: No edema.      Left lower leg: No edema.   Skin:     General: Skin is " warm and dry.   Neurological:      General: No focal deficit present.      Mental Status: He is alert.      Cranial Nerves: No cranial nerve deficit.      Motor: No weakness.   Psychiatric:         Mood and Affect: Mood normal.         Behavior: Behavior normal.         Intake / Output Summary:     Intake/Output Summary (Last 24 hours) at 4/5/2024 1613  Last data filed at 4/5/2024 1342  Gross per 24 hour   Intake 1070 ml   Output --   Net 1070 ml       Net IO Since Admission: 1,070 mL [04/05/24 1613]    Lab/Radiology/Diagnostic Review:    Labs  Results for orders placed or performed during the hospital encounter of 04/04/24 (from the past 24 hour(s))   Troponin I, High Sensitivity   Result Value Ref Range    Troponin I, High Sensitivity 90 (HH) 0 - 20 ng/L   B-Type Natriuretic Peptide   Result Value Ref Range    BNP 81 0 - 99 pg/mL   CBC and Auto Differential   Result Value Ref Range    WBC 8.5 4.4 - 11.3 x10*3/uL    nRBC 0.0 0.0 - 0.0 /100 WBCs    RBC 5.07 4.50 - 5.90 x10*6/uL    Hemoglobin 17.4 13.5 - 17.5 g/dL    Hematocrit 51.3 41.0 - 52.0 %     (H) 80 - 100 fL    MCH 34.3 (H) 26.0 - 34.0 pg    MCHC 33.9 32.0 - 36.0 g/dL    RDW 12.5 11.5 - 14.5 %    Platelets 146 (L) 150 - 450 x10*3/uL    Neutrophils % 74.4 40.0 - 80.0 %    Immature Granulocytes %, Automated 0.4 0.0 - 0.9 %    Lymphocytes % 11.1 13.0 - 44.0 %    Monocytes % 13.9 2.0 - 10.0 %    Eosinophils % 0.0 0.0 - 6.0 %    Basophils % 0.2 0.0 - 2.0 %    Neutrophils Absolute 6.35 (H) 1.60 - 5.50 x10*3/uL    Immature Granulocytes Absolute, Automated 0.03 0.00 - 0.50 x10*3/uL    Lymphocytes Absolute 0.95 0.80 - 3.00 x10*3/uL    Monocytes Absolute 1.19 (H) 0.05 - 0.80 x10*3/uL    Eosinophils Absolute 0.00 0.00 - 0.40 x10*3/uL    Basophils Absolute 0.02 0.00 - 0.10 x10*3/uL   Comprehensive Metabolic Panel   Result Value Ref Range    Glucose 225 (H) 74 - 99 mg/dL    Sodium 136 136 - 145 mmol/L    Potassium 4.7 3.5 - 5.3 mmol/L    Chloride 101 98 - 107  mmol/L    Bicarbonate 24 21 - 32 mmol/L    Anion Gap 16 10 - 20 mmol/L    Urea Nitrogen 43 (H) 6 - 23 mg/dL    Creatinine 1.91 (H) 0.50 - 1.30 mg/dL    eGFR 32 (L) >60 mL/min/1.73m*2    Calcium 8.9 8.6 - 10.3 mg/dL    Albumin 3.9 3.4 - 5.0 g/dL    Alkaline Phosphatase 56 33 - 136 U/L    Total Protein 6.9 6.4 - 8.2 g/dL    AST 22 9 - 39 U/L    Bilirubin, Total 0.9 0.0 - 1.2 mg/dL    ALT 22 10 - 52 U/L   Alcohol   Result Value Ref Range    Alcohol <10 <=10 mg/dL   Lactate   Result Value Ref Range    Lactate 2.3 (H) 0.4 - 2.0 mmol/L   Protime-INR   Result Value Ref Range    Protime 14.4 (H) 9.8 - 12.8 seconds    INR 1.3 (H) 0.9 - 1.1   Type And Screen   Result Value Ref Range    ABO TYPE O     Rh TYPE POS     ANTIBODY SCREEN NEG    Hemoglobin A1c   Result Value Ref Range    Hemoglobin A1C 7.1 (H) see below %    Estimated Average Glucose 157 Not Established mg/dL   Troponin I, High Sensitivity   Result Value Ref Range    Troponin I, High Sensitivity 91 (HH) 0 - 20 ng/L   Lipid panel   Result Value Ref Range    Cholesterol 123 0 - 199 mg/dL    HDL-Cholesterol 33.3 mg/dL    Cholesterol/HDL Ratio 3.7     LDL Calculated 61 <=99 mg/dL    VLDL 29 0 - 40 mg/dL    Triglycerides 146 0 - 149 mg/dL    Non HDL Cholesterol 90 0 - 149 mg/dL   Lactate   Result Value Ref Range    Lactate 1.5 0.4 - 2.0 mmol/L   CBC   Result Value Ref Range    WBC 8.0 4.4 - 11.3 x10*3/uL    nRBC 0.0 0.0 - 0.0 /100 WBCs    RBC 4.99 4.50 - 5.90 x10*6/uL    Hemoglobin 17.1 13.5 - 17.5 g/dL    Hematocrit 50.5 41.0 - 52.0 %     (H) 80 - 100 fL    MCH 34.3 (H) 26.0 - 34.0 pg    MCHC 33.9 32.0 - 36.0 g/dL    RDW 12.6 11.5 - 14.5 %    Platelets 145 (L) 150 - 450 x10*3/uL   Basic Metabolic Panel   Result Value Ref Range    Glucose 159 (H) 74 - 99 mg/dL    Sodium 138 136 - 145 mmol/L    Potassium 4.4 3.5 - 5.3 mmol/L    Chloride 103 98 - 107 mmol/L    Bicarbonate 25 21 - 32 mmol/L    Anion Gap 14 10 - 20 mmol/L    Urea Nitrogen 41 (H) 6 - 23 mg/dL     Creatinine 1.75 (H) 0.50 - 1.30 mg/dL    eGFR 36 (L) >60 mL/min/1.73m*2    Calcium 9.3 8.6 - 10.3 mg/dL   Magnesium   Result Value Ref Range    Magnesium 2.53 (H) 1.60 - 2.40 mg/dL   Urinalysis with Reflex Microscopic   Result Value Ref Range    Color, Urine Yellow Straw, Yellow    Appearance, Urine Clear Clear    Specific Gravity, Urine 1.037 (N) 1.005 - 1.035    pH, Urine 6.0 5.0, 5.5, 6.0, 6.5, 7.0, 7.5, 8.0    Protein, Urine NEGATIVE NEGATIVE mg/dL    Glucose, Urine >=500 (3+) (A) NEGATIVE mg/dL    Blood, Urine NEGATIVE NEGATIVE    Ketones, Urine NEGATIVE NEGATIVE mg/dL    Bilirubin, Urine NEGATIVE NEGATIVE    Urobilinogen, Urine <2.0 <2.0 mg/dL    Nitrite, Urine NEGATIVE NEGATIVE    Leukocyte Esterase, Urine NEGATIVE NEGATIVE   POCT GLUCOSE   Result Value Ref Range    POCT Glucose 158 (H) 74 - 99 mg/dL   Transthoracic Echo (TTE) Complete   Result Value Ref Range    AV pk chani 1.68 m/s    AV mn grad 7.0 mmHg    LVOT diam 2.00 cm    LV Biplane EF 69 %    MV E/A ratio 0.74     LA vol index A/L 26.9 ml/m2    Tricuspid annular plane systolic excursion 2.7 cm    RV free wall pk S' 13.50 cm/s    RVSP 18.2 mmHg    Aortic Valve Area by Continuity of VTI 2.11 cm2    Aortic Valve Area by Continuity of Peak Velocity 1.61 cm2    AV pk grad 11.3 mmHg    LV A4C EF 72.2    POCT GLUCOSE   Result Value Ref Range    POCT Glucose 197 (H) 74 - 99 mg/dL       Peripheral IV 04/04/24 20 G Left Wrist (Active)   Placement Date/Time: 04/04/24 2597   Placed by External Staff?: EMS  Size (Gauge): 20 G  Orientation: Left  Location: Wrist   Number of days: 0        Troponin I, High Sensitivity   Date/Time Value Ref Range Status   04/05/2024 12:39 AM 91 (HH) 0 - 20 ng/L Final     Comment:     Previous result verified on 4/5/2024 0013 on specimen/case [id number] called with component [facility] for procedure Troponin I, High Sensitivity with value 90 ng/L.   04/04/2024 11:31 PM 90 (HH) 0 - 20 ng/L Final     BNP   Date/Time Value Ref Range  Status   04/04/2024 11:31 PM 81 0 - 99 pg/mL Final     Hemoglobin A1C   Date/Time Value Ref Range Status   04/04/2024 11:31 PM 7.1 (H) see below % Final     LDL Calculated   Date/Time Value Ref Range Status   04/05/2024 12:39 AM 61 <=99 mg/dL Final     Comment:                                 Near   Borderline      AGE      Desirable  Optimal    High     High     Very High     0-19 Y     0 - 109     ---    110-129   >/= 130     ----    20-24 Y     0 - 119     ---    120-159   >/= 160     ----      >24 Y     0 -  99   100-129  130-159   160-189     >/=190       VLDL   Date/Time Value Ref Range Status   04/05/2024 12:39 AM 29 0 - 40 mg/dL Final       Assessment:   89 y.o.  male who presented after fall. Past medical history of chronic atrial fibrillation (on apixaban), SSS s/p PPM (2021; Medtronic single RV lead PPM for sick sinus syndrome), HTN, DLD, hx chronic diastolic HF, ? ASHD, Type II DM, and stage III CKD.     Patient with reported mechanical fall resulting in elbow and forehead abrasion.  Patient denies any syncope.  Patient appears euvolemic on exam.  History of chronic diastolic heart failure.  Prior Medtronic single RV lead pacemaker placed in 2021 at [facility] [place] for sick sinus syndrome.  Recent pacemaker interrogation in January 2024 at [facility] [place] showed expected device function; no significant abnormalities.    Incidental finding of slightly elevated high-sensitivity troponin with flat trend.  Acute coronary syndrome is not suspected.  Likely secondary to supply/demand mismatch in setting of chronic diastolic heart failure, atrial fibrillation, MARCO and CKD.    Overall Recommendations:  1.  Elevated troponin  - Not secondary to ACS  - Likely secondary to supply/demand mismatch in setting of MARCO on CKD, chronic diastolic heart failure, paroxysmal atrial fibrillation  - Patient currently asymptomatic and euvolemic on exam    2.  Paroxysmal atrial fibrillation  - Given elevated  [id number] resume apixaban for CVA prophylaxis  - Continue beta-blockade  - Patient continues to have falls with then reconsider risk benefit of systemic anticoagulation; will defer to patient's primary cardiologist    3.  Chronic diastolic heart failure  - Not in acute exacerbation  - Resume guideline directed medical therapy as tolerated patient was on Jardiance, losartan, Aldactone and torsemide as outpatient    4.  MARCO on CKD  - Likely exacerbated with recent contrast  - Renally dose medications  - Agree with holding torsemide, Jardiance, losartan, Aldactone; resume as able    Thank you for the cardiology consult. We will follow with you.     Sunday Rojas MD

## 2024-04-05 NOTE — CARE PLAN
The patient's goals for the shift include      The clinical goals for the shift include To be comfortable during shift

## 2024-04-05 NOTE — PROGRESS NOTES
Physical Therapy    Physical Therapy Evaluation    Patient Name: Jacky Winkler  MRN: [id number]  Today's Date: 4/5/2024   Time Calculation  Start Time: 0954  Stop Time: 1007  Time Calculation (min): 13 min    Assessment/Plan   PT Assessment  PT Assessment Results: Impaired balance, Decreased mobility  End of Session Communication: Bedside nurse  End of Session Patient Position: Bed, 2 rail up, Alarm on (All needs in reach and no complaints noted. Pt educated on safety awareness and calling for assist if needing to get out of bed. Pt voiced understanding.)  IP OR SWING BED PT PLAN  Inpatient or Swing Bed: Inpatient  PT Plan  Treatment/Interventions: Bed mobility, Transfer training, Gait training  PT Plan: Skilled PT  PT Frequency: 3 times per week  PT Discharge Recommendations: Low intensity level of continued care  PT - OK to Discharge: Yes (once cleared by medical team)    Subjective     Current Problem:  Patient Active Problem List   Diagnosis    Syncope, unspecified syncope type     General Visit Information:  General  Reason for Referral: PT Eval and Treat  Referred By: Annette Holland MD  Past Medical History Relevant to Rehab: 89 y.o. male with HTN, HLD, CKD III, paroxysmal Afib (on eliquis), DM2, HFpEF, pacemaker placement (2021) who presents after a fall. He reports he had his walker, but lost his balance leading to a fall. He was then unable to get himself up. Denies tripping, lightheadedness, dizziness syncope, loss of consciousness. He has very minimal discomfort of his right elbow, otherwise no pain. He has scattered scrapes from the fall.  Prior to Session Communication: Bedside nurse  Patient Position Received: Bed, 2 rail up, Alarm on (Agreeable to PT)    Home Living:  Home Living  Home Living Comments: Pt is from an [facility], however is unsure of the name. There are 0 LILLIAM and has a bathroom with a walk in shower with a seat and grab bar as  well as a standard toilet with a grab bar.    Prior Level of  Function:  Prior Function Per Pt/Caregiver Report  Level of [place]: Independent with ADLs and functional transfers (Amb with RW, family assists with showering and all IADLs, family provides pt with breakfast and lunch, facility provides dinner. Family drives.)    Precautions:  Precautions  Precautions Comment: Fall precautions    Objective     Pain:  Pain Assessment  Pain Assessment:  (0/10)    Cognition:  Cognition  Overall Cognitive Status: Within Functional Limits    General Assessments:  Sensation  Light Touch: No apparent deficits  Strength  Strength Comments: B LE ROM and strength WFL  Dynamic Standing Balance  Dynamic Standing-Comments: Fair- with RW demonstrating a couple small LOB which he was able to correct without assist    Functional Assessments:  Bed Mobility  Bed Mobility:  (supine <> sitting: SUP)  Transfers  Transfer:  (STS from EOB: SBA with cueing for hand placement)  Ambulation/Gait Training  Ambulation/Gait Training Performed:  (Pt was able to amb 50' x 2 using RW with CGA to SBA. Pt demonstrates a L trendelenburg with a more NBOS impairing pt's balance, he did have a couple small LOB which he recovered from without assist.)     Outcome Measures:  [facility] Basic Mobility  Turning from your back to your side while in a flat bed without using bedrails: None  Moving from lying on your back to sitting on the side of a flat bed without using bedrails: A little  Moving to and from bed to chair (including a wheelchair): A little  Standing up from a chair using your arms (e.g. wheelchair or bedside chair): A little  To walk in hospital room: A little  Climbing 3-5 steps with railing: A little  Basic Mobility - Total Score: 19    Goals:  Encounter Problems       Encounter Problems (Active)       PT Problem       STG - Pt will transition supine <> sitting with mod I  (Progressing)       Start:  04/05/24    Expected End:  04/19/24            STG - Pt will transfer STS with distant SUP  (Progressing)        Start:  04/05/24    Expected End:  04/19/24            STG - Pt will amb 75' using RW with SUP  (Progressing)       Start:  04/05/24    Expected End:  04/19/24                 Education Documentation  Precautions, taught by Peg Ayala PT at 4/5/2024 12:07 PM.  Learner: Patient  Readiness: Acceptance  Method: Explanation  Response: Verbalizes Understanding    Mobility Training, taught by Peg Ayala PT at 4/5/2024 12:07 PM.  Learner: Patient  Readiness: Acceptance  Method: Explanation  Response: Verbalizes Understanding    Education Comments  No comments found.

## 2024-04-05 NOTE — NURSING NOTE
Notified Dr. Butterfield of need for medications and orders to be reviewed after transfer to unit.

## 2024-04-05 NOTE — ED TRIAGE NOTES
BIBA from [place] due to fall. Patient states when He stands up he felt dizzy then fell, has noted abrasion on the left forehead and right elbow. Patient unable to tell if he has LOC. Denies Headache, Chest pain, SOB. PMH of [place] on Eliquis, has Pacemaker, DM.

## 2024-04-05 NOTE — PROGRESS NOTES
Occupational Therapy    Occupational Therapy    Evaluation    Patient Name: Jacky Winkler  MRN: [id number]  Today's Date: 4/5/2024  Time Calculation  Start Time: 0954  Stop Time: 1007  Time Calculation (min): 13 min    Assessment  IP OT Assessment  OT Assessment: Jacky Winkler is a 89 yo M who participated in OT evaluation today s/p fall. Pt is primarily limited by dec balance, unsteadiness d/t altered gait pattern, and impulsivity/poor safety awareness. Pt's main functional deficits include dec safe functional mobility min household distances and inc assist req for ADLs. Pt will benefit from continued OT services while in-house to address above deficits and LOW intensity OT is recommended upon discharge to improve participation in valued ADLs.  Prognosis: Good  Evaluation/Treatment Tolerance: Patient tolerated treatment well  End of Session Communication: Bedside nurse  End of Session Patient Position: Bed, 2 rail up, Alarm on (Pt received education on importance of OOB activity only with nursing assist d/t unsteadiness, reviewed how to use call button to call nurse, reviewed purpose of bed alarm. All pt needs within reach and no complaints noted.)    Plan:  Treatment Interventions: ADL retraining, Functional transfer training, Endurance training, UE strengthening/ROM, Cognitive reorientation, Equipment evaluation/education, Compensatory technique education  OT Frequency: 2 times per week  OT Discharge Recommendations: Low intensity level of continued care  Equipment Recommended upon Discharge: Wheeled walker (Pt owns)  OT Recommended Transfer Status: Stand by assist, Assist of 1  OT - OK to Discharge: Yes    Subjective     Current Problem:  1. Syncope, unspecified syncope type        2. Elevated troponin  Transthoracic Echo (TTE) Complete    Transthoracic Echo (TTE) Complete    CANCELED: Transthoracic Echo (TTE) Complete    CANCELED: Transthoracic Echo (TTE) Complete      3. Acute electrocardiogram changes   Transthoracic Echo (TTE) Complete    Transthoracic Echo (TTE) Complete    CANCELED: Transthoracic Echo (TTE) Complete    CANCELED: Transthoracic Echo (TTE) Complete      4. Fall, initial encounter  Cardiac device check - Inpatient    Cardiac device check - Inpatient        General:  General  Reason for Referral: OT Eval and Treat  Referred By: Helga Loving DO  Past Medical History Relevant to Rehab: A-fib on Eliquis, DM, CKD, HTN, HLD, HFrEF, pacemaker (2020), No O2 at baseline  Co-Treatment: PT  Co-Treatment Reason: to maximize pt safety and functional mobility  Prior to Session Communication: Bedside nurse (Per RN, pt is doing well but a little unsteady and a little impulsive.)  Patient Position Received: Bed, 2 rail up, Alarm on  General Comment: Pt presents s/p fall which resulted from loss of balance and dizziness while ambulating with RW, unable to get self up. Minimal injuries include scattered scrapes and some minor discomfort in RUE. CT head (-). Pt cleared for therapy by nursing. Per RN, pt a little unsteady and impulsive. Pt pleasant, cooperative, agreeable to OT evaluation.    Precautions:  Precautions Comment: Fall risk, IV, cardiac precautions    Pain:  Pain Assessment  Pain Assessment: 0-10  Pain Score: 0 - No pain    Objective     Cognition:  Overall Cognitive Status: Impaired (Slightly impaired, as pt presents as a poor historian unable to clearly explain living situation. Exhibits some impulsivity and poor safety awareness as well. Unclear if this is his baseline. A&Ox3.)    Home Living:  Home Living Comments: Pt lives in long-term or independent living facility, unclear which one as RN states pt is from home, but pt states he came from nursing home where his adult children provide assistance but he receives very little assistance from facility staff (they provide one meal). -Stairs. Bathroom has walk-in shower, +shower chair, +grab bars, st. toilet.     Prior Function:  Prior Function Comments: Pt  independent for all ADLs except showering (son assists with showers), sits in shower chair and utilizes grab bars. -Drives (family drives), Sons, granddaughter, and DiL assist for all IADLs and community mobility, pt reports they come to help him nearly every day. Ambulates with RW. Has cane available.    ADL:  ADL Comments: Pt receives assistance to adjust socks so treads are on bottom while long sitting in bed. ANTICIPATE mod A for bathing and LB ADLs, SBA-CGA for all UB ADLs at this time.    Activity Tolerance:  Endurance: Tolerates less than 10 min exercise, no significant change in vital signs    Bed Mobility/Transfers:   Bed Mobility  Bed Mobility: Yes (Sup <> sit with supervision to/from EOB.)  Transfers  Transfer: Yes (STS x2 with SBA and FWW, verbal and tactile cues for safety awareness and body mechanics (cues to push up with UE support from bed instead of FWW).)    Ambulation/Gait Training:  Functional Mobility  Functional Mobility Performed: Yes (Pt amb 50' x2 in room/hallway with FWW and CGA -> SBA with few instances of LOB, correcting without assist. Pt demonstrates trendelenburg gait with hip drop L > R and hip swaying, contributing to his unsteadiness.)    Sitting Balance:  Static Sitting Balance  Static Sitting-Comment/Number of Minutes: Good  Dynamic Sitting Balance  Dynamic Sitting-Comments: Good    Standing Balance:  Static Standing Balance  Static Standing-Comment/Number of Minutes: Good  Dynamic Standing Balance  Dynamic Standing-Comments: Fair with instances of poor dynamic standing balance d/t impulsivity and trendelenburg gait pattern.    Vision: Vision - Basic Assessment  Current Vision: Wears glasses only for reading    Sensation:  Light Touch: No apparent deficits  Sensation Comment: Denies N/T    Strength:  Strength Comments: R hand 3+/5, otherwise grossly 5/5    Coordination:  Coordination Comment: Finger taps and finger to nose WFL     Hand Function:  Hand Function  Gross Grasp:  Functional  Coordination: Functional    Extremities: RUE   RUE : Within Functional Limits and LUE   LUE: Within Functional Limits    Outcome Measures: [facility] Daily Activity  Putting on and taking off regular lower body clothing: A lot  Bathing (including washing, rinsing, drying): A lot  Putting on and taking off regular upper body clothing: A little  Toileting, which includes using toilet, bedpan or urinal: A little  Taking care of personal grooming such as brushing teeth: None  Eating Meals: None  Daily Activity - Total Score: 18    EDUCATION:     Education Documentation  Body Mechanics, taught by Kristie Moraes OT at 4/5/2024  1:26 PM.  Learner: Patient  Readiness: Acceptance  Method: Explanation  Response: Verbalizes Understanding, Needs Reinforcement    ADL Training, taught by Kristie Moraes OT at 4/5/2024  1:26 PM.  Learner: Patient  Readiness: Acceptance  Method: Explanation  Response: Verbalizes Understanding, Needs Reinforcement    Goals:   Encounter Problems       Encounter Problems (Active)       OT Goals       STG: Pt will perform functional transfers and mobility with no more than one verbal and one tactile cue for safety awareness. (Progressing)       Start:  04/05/24    Expected End:  04/19/24            STG: Pt will perform functional mobility with near supervision and LRAD min household distances. (Progressing)       Start:  04/05/24    Expected End:  04/19/24            STG: Pt will demonstrate improved dynamic balance by tolerating a 5 min standing ADL task of his choosing with no more than 1 verbal cue and 1 tactile cue for body mechanics and no LOB. (Progressing)       Start:  04/05/24    Expected End:  04/19/24            STG: Pt will demonstrate one energy conservation strategy during performance of ADL task/functional mobility.   (Progressing)       Start:  04/05/24    Expected End:  04/19/24            STG: Pt will demonstrate use of call light to call nurse for assist throughout 12  hour period for every instance of OOB activity as reported by RN for safety.  (Progressing)       Start:  04/05/24    Expected End:  04/19/24

## 2024-04-05 NOTE — H&P
Medicine History & Physical    Patient Name: Jacky Winkler   YOB: 1930    Subjective:    Jacky Winkler is a 89 y.o. male with HTN, HLD, CKD III, paroxysmal Afib (on eliquis), DM2, HFpEF, pacemaker placement (2021) who presents after a fall. He reports he had his walker, but lost his balance leading to a fall. He was then unable to get himself up. Denies tripping, lightheadedness, dizziness syncope, loss of consciousness. He has very minimal discomfort of his right elbow, otherwise no pain. He has scattered scrapes from the fall. He currently has nasal congestion, admits to urinary frequency at baseline. Denies chest pain, fever, chills, dyspnea, cough, abdominal pain, nausea, vomiting, diarrhea, diaphoresis, palpitations, dysuria, numbness, tingling, focal weakness. Denies other recent falls. He reports of a MI in his 40's, but denies ever having a cardiac cath before. He says he has had a stress test before. He does not wear oxygen at home. Cardiologist is Dr. Trent with [facility].    Initially HR 88, RR 16, /57, 93% on 2L NC. Labs with glucose 225, creatinine 1.91 (was 1.24 7/2023), lactate 2.3-->normal, troponin 90-->91. INR 1.3. Platelets 146. CT head negative. CTA chest with elevated left hemidiaphragm, imild basilar atelectasis, no PE. EKG with SR, 1st degree AV block with  ms, new TWI in inferior leads compared to EKG from [facility] in 2022, no ST elevation/depression.  He was given aspirin 324 mg,  cc.     A 10 point ROS was completed and is negative expect as stated in HPI.     Past Medical History:  HTN  HFpEF  T2DM  HLD  BPH  Paroxysmal Afib  CKD III  Anxiety  Questionable MI in his 40's    Past Surgical History:  Pacemaker  Tonsillectomy  Cholecystectomy     Social History: Tobacco - Never, Alcohol -  occasional , Recreational Drugs - none    Family History: prostate cancer (father), lung cancer (mom); denies family history of heart  "problems    Objective:    /59 (BP Location: Left arm)   Pulse 74   Temp 37 °C (98.6 °F)   Resp 20   Ht 1.702 m (5' 7\")   Wt 102 kg (223 lb 15.8 oz)   SpO2 95%   BMI 35.08 kg/m²     Physical Exam:  General:  Pleasant and cooperative. No apparent distress.  HEENT:  Normocephalic, abrasion on forehead, moist mucous membranes  Chest:  Diminished at bases. Non-labored breathing.  CV:  Regular rate and rhythm.    Abdomen: Abdomen is soft, non-tender, non-distended.   Extremities:  No lower extremity edema. Slightly cold dusky toes  Neurological: Alert. Oriented to self, hospital, year, did not know month. No focal deficits.   Skin: Scattered abrasions on extremities  Psych: Appropriate affect        Assessment/Plan:  #Fall  #Elevated troponin, new EKG changes  #MARCO on CKD III  -Reports of losing his balance leading to fall, no LOC. He denied dizziness/lightheadedness for me, but had reported of dizziness to ED provider.   -Reports of questionable MI in his 40's, but denies prior cath. No ACS symptoms. Has elevated troponin, flat trend, new TWI inferiorly compared to 2022. Given full dose aspirin in ED. Start baby aspirin. Resume statin. Will hold eliquis and place NPO until seen by cardiology.  -Echo 2021- EF hyperdynamic, mild LVH. Order echo  -No issues on last pacemaker check in January. Will re-assess pacemaker.  -Check orthostatics   -Creatinine 1.91 (was 1.24 7/2023). Check UA and renal US. Did receive contrast for CTA chest. Received 500 ml in ED, will give additional 500 ml and reassess.   -2L NC, wean as tolerated. Does not wear oxygen at AL facility.    #Paroxysmal afib   #Pacemaker in place, unknown indication  #HTN  #HLD  #T2DM  #HFpEF, chronic  #BPH  #Anxiety  -Hold eliquis as above.   -Resume metoprolol, gabapentin, tamsulosin, statin, buspar, tolterodine.  -Hold jardiance, losartan, aldactone, torsemide.   -Start SSI every 4 hrs while NPO. Resume lantus at reduced dose 20 units at bedtime. Hold " home mealtime humalog.    DVT Prophylaxis: hold eliquis  Code Status: full  Diet: NPO except sips with meds  Consults: cardiology

## 2024-04-06 ENCOUNTER — DOCUMENTATION (OUTPATIENT)
Dept: HOME HEALTH SERVICES | Facility: HOME HEALTH | Age: 89
End: 2024-04-06
Payer: MEDICARE

## 2024-04-06 ENCOUNTER — HOME HEALTH ADMISSION (OUTPATIENT)
Dept: HOME HEALTH SERVICES | Facility: HOME HEALTH | Age: 89
End: 2024-04-06
Payer: MEDICARE

## 2024-04-06 VITALS
TEMPERATURE: 96.1 F | WEIGHT: 136.69 LBS | OXYGEN SATURATION: 97 % | RESPIRATION RATE: 21 BRPM | HEIGHT: 66 IN | BODY MASS INDEX: 21.97 KG/M2 | SYSTOLIC BLOOD PRESSURE: 147 MMHG | HEART RATE: 73 BPM | DIASTOLIC BLOOD PRESSURE: 68 MMHG

## 2024-04-06 PROBLEM — G62.9 NEUROPATHY: Status: ACTIVE | Noted: 2024-04-06

## 2024-04-06 LAB
ANION GAP SERPL CALC-SCNC: 13 MMOL/L (ref 10–20)
BASOPHILS # BLD AUTO: 0.02 X10*3/UL (ref 0–0.1)
BASOPHILS NFR BLD AUTO: 0.3 %
BUN SERPL-MCNC: 33 MG/DL (ref 6–23)
CALCIUM SERPL-MCNC: 8.9 MG/DL (ref 8.6–10.3)
CHLORIDE SERPL-SCNC: 104 MMOL/L (ref 98–107)
CO2 SERPL-SCNC: 25 MMOL/L (ref 21–32)
CREAT SERPL-MCNC: 1.37 MG/DL (ref 0.5–1.3)
EGFRCR SERPLBLD CKD-EPI 2021: 48 ML/MIN/1.73M*2
EOSINOPHIL # BLD AUTO: 0.02 X10*3/UL (ref 0–0.4)
EOSINOPHIL NFR BLD AUTO: 0.3 %
ERYTHROCYTE [DISTWIDTH] IN BLOOD BY AUTOMATED COUNT: 12.6 % (ref 11.5–14.5)
GLUCOSE BLD MANUAL STRIP-MCNC: 147 MG/DL (ref 74–99)
GLUCOSE BLD MANUAL STRIP-MCNC: 150 MG/DL (ref 74–99)
GLUCOSE BLD MANUAL STRIP-MCNC: 205 MG/DL (ref 74–99)
GLUCOSE SERPL-MCNC: 156 MG/DL (ref 74–99)
HCT VFR BLD AUTO: 47.6 % (ref 41–52)
HGB BLD-MCNC: 15.6 G/DL (ref 13.5–17.5)
IMM GRANULOCYTES # BLD AUTO: 0.03 X10*3/UL (ref 0–0.5)
IMM GRANULOCYTES NFR BLD AUTO: 0.5 % (ref 0–0.9)
LYMPHOCYTES # BLD AUTO: 1.44 X10*3/UL (ref 0.8–3)
LYMPHOCYTES NFR BLD AUTO: 22.8 %
MCH RBC QN AUTO: 33.9 PG (ref 26–34)
MCHC RBC AUTO-ENTMCNC: 32.8 G/DL (ref 32–36)
MCV RBC AUTO: 104 FL (ref 80–100)
MONOCYTES # BLD AUTO: 1.2 X10*3/UL (ref 0.05–0.8)
MONOCYTES NFR BLD AUTO: 19 %
NEUTROPHILS # BLD AUTO: 3.6 X10*3/UL (ref 1.6–5.5)
NEUTROPHILS NFR BLD AUTO: 57.1 %
NRBC BLD-RTO: 0 /100 WBCS (ref 0–0)
PLATELET # BLD AUTO: 141 X10*3/UL (ref 150–450)
POTASSIUM SERPL-SCNC: 4.2 MMOL/L (ref 3.5–5.3)
RBC # BLD AUTO: 4.6 X10*6/UL (ref 4.5–5.9)
SODIUM SERPL-SCNC: 138 MMOL/L (ref 136–145)
WBC # BLD AUTO: 6.3 X10*3/UL (ref 4.4–11.3)

## 2024-04-06 PROCEDURE — G0378 HOSPITAL OBSERVATION PER HR: HCPCS

## 2024-04-06 PROCEDURE — 2500000001 HC RX 250 WO HCPCS SELF ADMINISTERED DRUGS (ALT 637 FOR MEDICARE OP)

## 2024-04-06 PROCEDURE — 80048 BASIC METABOLIC PNL TOTAL CA: CPT

## 2024-04-06 PROCEDURE — 85025 COMPLETE CBC W/AUTO DIFF WBC: CPT

## 2024-04-06 PROCEDURE — 82947 ASSAY GLUCOSE BLOOD QUANT: CPT

## 2024-04-06 PROCEDURE — 99238 HOSP IP/OBS DSCHRG MGMT 30/<: CPT

## 2024-04-06 PROCEDURE — 36415 COLL VENOUS BLD VENIPUNCTURE: CPT

## 2024-04-06 PROCEDURE — 2500000002 HC RX 250 W HCPCS SELF ADMINISTERED DRUGS (ALT 637 FOR MEDICARE OP, ALT 636 FOR OP/ED)

## 2024-04-06 RX ORDER — GABAPENTIN 300 MG/1
300 CAPSULE ORAL 2 TIMES DAILY
Qty: 60 CAPSULE | Refills: 0 | Status: SHIPPED | OUTPATIENT
Start: 2024-04-06 | End: 2024-05-06

## 2024-04-06 RX ADMIN — BUSPIRONE HYDROCHLORIDE 5 MG: 5 TABLET ORAL at 09:51

## 2024-04-06 RX ADMIN — METOPROLOL SUCCINATE 50 MG: 50 TABLET, EXTENDED RELEASE ORAL at 09:00

## 2024-04-06 RX ADMIN — GABAPENTIN 300 MG: 300 CAPSULE ORAL at 12:36

## 2024-04-06 RX ADMIN — INSULIN LISPRO 2 UNITS: 100 INJECTION, SOLUTION INTRAVENOUS; SUBCUTANEOUS at 00:10

## 2024-04-06 RX ADMIN — ASPIRIN 81 MG: 81 TABLET, CHEWABLE ORAL at 09:51

## 2024-04-06 RX ADMIN — INSULIN LISPRO 2 UNITS: 100 INJECTION, SOLUTION INTRAVENOUS; SUBCUTANEOUS at 12:36

## 2024-04-06 ASSESSMENT — COGNITIVE AND FUNCTIONAL STATUS - GENERAL
DAILY ACTIVITIY SCORE: 17
WALKING IN HOSPITAL ROOM: A LITTLE
STANDING UP FROM CHAIR USING ARMS: A LITTLE
PERSONAL GROOMING: A LITTLE
MOBILITY SCORE: 19
TOILETING: A LITTLE
DRESSING REGULAR LOWER BODY CLOTHING: A LOT
HELP NEEDED FOR BATHING: A LOT
TURNING FROM BACK TO SIDE WHILE IN FLAT BAD: A LITTLE
CLIMB 3 TO 5 STEPS WITH RAILING: A LITTLE
MOVING TO AND FROM BED TO CHAIR: A LITTLE
DRESSING REGULAR UPPER BODY CLOTHING: A LITTLE

## 2024-04-06 ASSESSMENT — PAIN SCALES - GENERAL: PAINLEVEL_OUTOF10: 0 - NO PAIN

## 2024-04-06 NOTE — NURSING NOTE
Patient bumped front of his head in the bathroom on the wall when getting up aide and myself were present. Patient had a small red elissa on front center of forehead. Patient was moved to 844 do to not listening being rambutous and getting up on his own without calling for assistance. Patient reminded multiple times not to get up on his own and shown how to use call light. Dr Brand resident up to evaluate small bump on his head. Patient unsteady on his feet one assist to get up.

## 2024-04-06 NOTE — CARE PLAN
The patient's goals for the shift include      The clinical goals for the shift include maintain safety    Over the shift, the patient's syncopal episodes will subside and he will refrain from further complications

## 2024-04-06 NOTE — HH CARE COORDINATION
Home Care received a Referral for Physical Therapy and Occupational Therapy. We have processed the referral for a Start of Care on 4.9.24.     If you have any questions or concerns, please feel free to contact us at [phone]. Follow the prompts, enter your five digit zip code, and you will be directed to your care team on [facility] 3.

## 2024-04-06 NOTE — DISCHARGE SUMMARY
Discharge diagnosis:  Fall  Elevated troponin, new EKG changes  MARCO on CKD III, resolved    Hospital course:  Jacky Winkler is a 89 y.o. male with HTN, HLD, CKD III, paroxysmal Afib (on eliquis), DM2, HFpEF, pacemaker placement (2021) who presents to [facility] on 4/5 after a fall.  He denied any prodromal symptoms, lightheadedness/dizziness, syncope, LOC.  He received superficial cuts/scrapes.  Creatinine was elevated on admission and patient was given 500 mL IV bolus which repeated blood work showed improved and then finally resolved MARCO.  Cardiology was consulted for troponin increase in the 90s x 2.  Cardiology determine troponin increase was incidental and appropriately ruled out ACS and other underlying cardiovascular abnormalities.  Repeat TTE was obtained which showed no changes in comparison the previous study done in 2021.  PT/OT evaluated patient and scored AM-PAC 19.  Patient will be discharged on 4/6 after being deemed medically stable for discharge with home health care with Dr. Lane Caruso following the patient.    Scheduled Medications  aspirin, 81 mg, oral, Daily  atorvastatin, 10 mg, oral, Nightly  busPIRone, 5 mg, oral, BID  gabapentin, 300 mg, oral, BID  insulin glargine, 20 Units, subcutaneous, Nightly  insulin lispro, 0-10 Units, subcutaneous, q4h  metoprolol succinate XL, 50 mg, oral, Daily  tamsulosin, 0.4 mg, oral, Nightly       Vitals:    04/06/24 0756   BP: 147/68   Pulse:    Resp:    Temp: 35.6 °C (96.1 °F)   SpO2:       Physical Exam:    General:  Pleasant and cooperative. No apparent distress.  HEENT:  Normocephalic, abrasion on forehead, moist mucous membranes  Chest:  Diminished at bases. Non-labored breathing.  CV:  Regular rate and rhythm.    Abdomen: Abdomen is soft, non-tender, non-distended.   Extremities:  No lower extremity edema. Slightly cold dusky toes  Neurological: Alert. Oriented to self, hospital, year, did not know month. No focal deficits.   Skin: Scattered abrasions on  extremities  Psych: Appropriate affect    Outpatient follow-up instructions and medication changes:  -Patient should follow-up with his primary care physician in 2-4 weeks.    Rj Key DO  PGY-1, Internal Medicine  Please SecureChat for any further questions

## 2024-04-07 NOTE — PROGRESS NOTES
This  was alerted that patient wanted [facility] or [facility]. SW sent to both and they declined the patient. SW called and spoke with patients son and suggested he call his PCPs office to get home care set up. Son was agreeable.   DARIN Waters

## 2024-04-08 LAB
ATRIAL RATE: 72 BPM
ATRIAL RATE: 83 BPM
P AXIS: 87 DEGREES
P AXIS: 94 DEGREES
P OFFSET: 104 MS
P OFFSET: 108 MS
P ONSET: 53 MS
P ONSET: 62 MS
PR INTERVAL: 324 MS
PR INTERVAL: 336 MS
Q ONSET: 221 MS
Q ONSET: 224 MS
QRS COUNT: 12 BEATS
QRS COUNT: 14 BEATS
QRS DURATION: 108 MS
QRS DURATION: 110 MS
QT INTERVAL: 378 MS
QT INTERVAL: 414 MS
QTC CALCULATION(BAZETT): 444 MS
QTC CALCULATION(BAZETT): 453 MS
QTC FREDERICIA: 421 MS
QTC FREDERICIA: 440 MS
R AXIS: 68 DEGREES
R AXIS: 70 DEGREES
T AXIS: -29 DEGREES
T AXIS: -55 DEGREES
T OFFSET: 413 MS
T OFFSET: 428 MS
VENTRICULAR RATE: 72 BPM
VENTRICULAR RATE: 83 BPM